# Patient Record
Sex: FEMALE | Race: WHITE | NOT HISPANIC OR LATINO
[De-identification: names, ages, dates, MRNs, and addresses within clinical notes are randomized per-mention and may not be internally consistent; named-entity substitution may affect disease eponyms.]

---

## 2020-01-17 PROBLEM — Z00.00 ENCOUNTER FOR PREVENTIVE HEALTH EXAMINATION: Status: ACTIVE | Noted: 2020-01-17

## 2020-01-31 ENCOUNTER — APPOINTMENT (OUTPATIENT)
Dept: PULMONOLOGY | Facility: CLINIC | Age: 70
End: 2020-01-31
Payer: MEDICARE

## 2020-01-31 VITALS
RESPIRATION RATE: 17 BRPM | HEART RATE: 79 BPM | DIASTOLIC BLOOD PRESSURE: 70 MMHG | WEIGHT: 116 LBS | HEIGHT: 61 IN | SYSTOLIC BLOOD PRESSURE: 130 MMHG | BODY MASS INDEX: 21.9 KG/M2 | OXYGEN SATURATION: 96 %

## 2020-01-31 DIAGNOSIS — J18.9 BRONCHITIS, NOT SPECIFIED AS ACUTE OR CHRONIC: ICD-10-CM

## 2020-01-31 DIAGNOSIS — Z78.9 OTHER SPECIFIED HEALTH STATUS: ICD-10-CM

## 2020-01-31 DIAGNOSIS — Z82.0 FAMILY HISTORY OF EPILEPSY AND OTHER DISEASES OF THE NERVOUS SYSTEM: ICD-10-CM

## 2020-01-31 DIAGNOSIS — Z87.09 PERSONAL HISTORY OF OTHER DISEASES OF THE RESPIRATORY SYSTEM: ICD-10-CM

## 2020-01-31 DIAGNOSIS — Z80.1 FAMILY HISTORY OF MALIGNANT NEOPLASM OF TRACHEA, BRONCHUS AND LUNG: ICD-10-CM

## 2020-01-31 DIAGNOSIS — Z87.891 PERSONAL HISTORY OF NICOTINE DEPENDENCE: ICD-10-CM

## 2020-01-31 DIAGNOSIS — Z86.19 PERSONAL HISTORY OF OTHER INFECTIOUS AND PARASITIC DISEASES: ICD-10-CM

## 2020-01-31 DIAGNOSIS — R06.83 SNORING: ICD-10-CM

## 2020-01-31 DIAGNOSIS — A31.0 PULMONARY MYCOBACTERIAL INFECTION: ICD-10-CM

## 2020-01-31 DIAGNOSIS — Z82.49 FAMILY HISTORY OF ISCHEMIC HEART DISEASE AND OTHER DISEASES OF THE CIRCULATORY SYSTEM: ICD-10-CM

## 2020-01-31 DIAGNOSIS — J18.9 PNEUMONIA, UNSPECIFIED ORGANISM: ICD-10-CM

## 2020-01-31 DIAGNOSIS — J40 BRONCHITIS, NOT SPECIFIED AS ACUTE OR CHRONIC: ICD-10-CM

## 2020-01-31 DIAGNOSIS — J44.9 CHRONIC OBSTRUCTIVE PULMONARY DISEASE, UNSPECIFIED: ICD-10-CM

## 2020-01-31 DIAGNOSIS — R05 COUGH: ICD-10-CM

## 2020-01-31 DIAGNOSIS — R09.82 POSTNASAL DRIP: ICD-10-CM

## 2020-01-31 DIAGNOSIS — R06.02 SHORTNESS OF BREATH: ICD-10-CM

## 2020-01-31 DIAGNOSIS — Z87.39 PERSONAL HISTORY OF OTHER DISEASES OF THE MUSCULOSKELETAL SYSTEM AND CONNECTIVE TISSUE: ICD-10-CM

## 2020-01-31 DIAGNOSIS — J47.9 BRONCHIECTASIS, UNCOMPLICATED: ICD-10-CM

## 2020-01-31 DIAGNOSIS — Z57.9 OCCUPATIONAL EXPOSURE TO UNSPECIFIED RISK FACTOR: ICD-10-CM

## 2020-01-31 PROCEDURE — 94726 PLETHYSMOGRAPHY LUNG VOLUMES: CPT

## 2020-01-31 PROCEDURE — 94060 EVALUATION OF WHEEZING: CPT

## 2020-01-31 PROCEDURE — 71046 X-RAY EXAM CHEST 2 VIEWS: CPT

## 2020-01-31 PROCEDURE — 94729 DIFFUSING CAPACITY: CPT

## 2020-01-31 PROCEDURE — ZZZZZ: CPT

## 2020-01-31 PROCEDURE — 94618 PULMONARY STRESS TESTING: CPT

## 2020-01-31 PROCEDURE — 99204 OFFICE O/P NEW MOD 45 MIN: CPT | Mod: 25

## 2020-01-31 RX ORDER — OLOPATADINE HYDROCHLORIDE 665 UG/1
0.6 SPRAY, METERED NASAL
Qty: 3 | Refills: 1 | Status: ACTIVE | COMMUNITY
Start: 2020-01-31 | End: 1900-01-01

## 2020-01-31 RX ORDER — MUCUS CLEARING DEVICE
EACH MISCELLANEOUS
Qty: 1 | Refills: 0 | Status: ACTIVE | COMMUNITY
Start: 2020-01-31 | End: 1900-01-01

## 2020-01-31 RX ORDER — AZELASTINE HYDROCHLORIDE 137 UG/1
0.1 SPRAY, METERED NASAL TWICE DAILY
Qty: 3 | Refills: 1 | Status: ACTIVE | COMMUNITY
Start: 2020-01-31 | End: 1900-01-01

## 2020-01-31 RX ORDER — FLUTICASONE FUROATE, UMECLIDINIUM BROMIDE AND VILANTEROL TRIFENATATE 100; 62.5; 25 UG/1; UG/1; UG/1
100-62.5-25 POWDER RESPIRATORY (INHALATION)
Refills: 0 | Status: ACTIVE | COMMUNITY

## 2020-01-31 RX ORDER — LEVALBUTEROL HYDROCHLORIDE 0.63 MG/3ML
0.63 SOLUTION RESPIRATORY (INHALATION)
Qty: 120 | Refills: 3 | Status: ACTIVE | COMMUNITY
Start: 2020-01-31 | End: 1900-01-01

## 2020-01-31 SDOH — HEALTH STABILITY - PHYSICAL HEALTH: OCCUPATIONAL EXPOSURE TO UNSPECIFIED RISK FACTOR: Z57.9

## 2020-01-31 NOTE — HISTORY OF PRESENT ILLNESS
[Former] : former [< 30 pack-years] : < 30 pack-years [TextBox_4] : Ms. HERNANDEZ is a 69 year old female presenting to the office today for initial pulmonary evaluation. Her chief complaint is\par -she reports she has been coughing for about 15-20 years\par -she states she went to a pulmonologist in 2010. Since then, she has seen 4 pulmonologists. Her current doctor is good but she wants another opinion.\par -she reports having had a CT at [facility] last in 6/2019\par -she is currently on Trelegy\par -she has had MAC in the past and had ABx for 18 months - in [id number].\par -she reports having SOB lately, sometimes when walking up stairs or inclines\par -she is coughing constantly, and is occasionally productive, sometimes clear and sometimes thick green sputum - often at night\par -she notes her weight is stable.\par -she reports having a post nasal drip\par -she believes her cough originates from her throat\par -she sometimes coughs during the night\par -she reports she snores\par -she notes her memory and concentration are good  \par -she could fall asleep while watching a boring TV show. 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 She also notes there was asbestos in that building as well\par -she denies any wheezing, palpitations, hemoptysis, chest pain, chest pressure, diarrhea, constipation, dysphagia, dizziness, leg swelling, leg pain, itchy eyes, itchy ears, heartburn, reflux, sour taste in the mouth, myalgias or arthralgias.  [TextBox_11] : .5 [TextBox_13] : 20 [TextBox_15] : ~1990

## 2020-01-31 NOTE — ADDENDUM
[FreeTextEntry1] : Documented by Jesus Rankin acting as a scribe for Dr. Shilo Arevalo on 01/31/2020.\par \par All medical record entries made by the Scribe were at my, Dr. Shilo Arevalo's, direction and personally dictated by me on 01/31/2020. I have reviewed the chart and agree that the record accurately reflects my personal performance of the history, physical exam, assessment and plan. I have also personally directed, reviewed, and agree with the discharge instructions.

## 2020-01-31 NOTE — PHYSICAL EXAM

## 2020-01-31 NOTE — PROCEDURE
[FreeTextEntry1] : Full PFT revealed mild-moderate obstructive dysfunction, with a FEV1 of 1.49L, which is 72% of predicted, with no change with use of bronchodilator, normal lung volumes, and a low diffusion of 11.1, which is 70% of predicted, with a normal flow volume loop \par \par CXR reveals a normal sized heart; chronic inflammatory changes b/l, bronchiectatic changes upper and lower lobes, no evidence of tumors or masses \par \par 6 minute walk test reveals a low saturation of 90% with slight dyspnea; walked 489 meters\par \par Full PFT (2015) revealed normal flows, with a FEV1 of 1.93L, which is 89% of predicted, and a diffusion of 13.7, which is 78% of predicted, with a normal flow volume loop \par \par Chest CT (6.2019) reveals bronchiectasis in the mid and peripheral airways, multiple locations b/l, associated with areas of consolidation, lingula, anterior segment of right upper lobe with consequent volume loss and waxing and waning areas in RUL and emphysematous changes in the RLL.

## 2020-01-31 NOTE — REVIEW OF SYSTEMS
[Postnasal Drip] : postnasal drip [Sinus Problems] : sinus problems [Cough] : cough [Sputum] : sputum [Dyspnea] : dyspnea [SOB on Exertion] : sob on exertion [Negative] : Endocrine [Poor Appetite] : no poor appetite [Hemoptysis] : no hemoptysis [Wheezing] : no wheezing [Chest Discomfort] : no chest discomfort [Palpitations] : no palpitations [GERD] : no gerd [Diarrhea] : no diarrhea [Constipation] : no constipation [Dysphagia] : no dysphagia [Arthralgias] : no arthralgias [Myalgias] : no myalgias

## 2020-01-31 NOTE — ASSESSMENT
[FreeTextEntry1] : Ms. HERNANDEZ is a 69 year old female with a history of osteoporosis, bronchiectasis, KRANTHI treated in 2012 at [facility], COPD, emphysema at the bases, frequent bronchitis and PNA, post nasal drip, occupational exposure in the workplace, snoring who comes into the office today for pulmonary evaluation\par \par The patient's shortness of breath is multifactorial due to:\par -pulmonary disease \par      -bronchiectasis\par      -COPD / emphysema\par -poor breathing mechanics \par -out of shape\par \par Her chronic cough is felt to be multifactorial due to:\par -Bronchiectasis\par -COPD\par -Post nasal drip syndrome\par \par Problem 1: Bronchiectasis\par -recommended to use the Aerobika device\par -complete sputum CNS\par \par Seen on the CT of the chest or chest x-ray signifies damaged bronchial tubes focal or diffuse which can be sites of recurrent infections. These areas can be colonized by various organisms including bacteria (hemophilous influenzae/Pseudomonas species etc.) as well as acid fast bacilli (mycobacterial disease- inclusive of TB/KRANTHI etc.). Sputum either for bacteria culture/sensitivity or AFB culture and sensitivity will need to be sent if the patient has sputum- 3 specimens on consecutive days will need to be dropped at the laboratory- if the patient can produce sputum.   The patient has previously used acapella, nebulizer and breathing techniques for airway clearance.  These methods have not provided appropriate secretion manage in this patient.  Hence, initiating vest therapy is necessary.\par \par Problem 2: COPD / Emphysema\par -Add Xopenex (0.63) TID by nebulizer, PRN  (gargle and rinse after use) before use of Trelegy\par -Add Trelegy 1 inhalation QD\par \par -COPD is a progressive disease and although it can’t be cured , appropriate management can slow its progression, reduce frequency and severity of exacerbations, and improve symptoms and the patient quality of life. Hospitalizations are the greatest contributor to the total COPD costs and account for up to 87% of total COPD related costs. Exacerbations are the main cause of admissions and subsequently account for the 40-75% of COPD costs. Inhaled maintenance therapy reduces the incidence of exacerbations in patients with stable COPD. Incorrect inhaler use and nonadherence are major obstacles to achieving COPD treatment goals. Many COPD patients have challenges (impaired inhalation, limited dexterity, reduced cognition: that limit their ability to correctly use their COPD treatment devices resulting in reduced symptom control. Of most importance is smoking cessation and early intervention with respiratory illnesses and contemplation for pulmonary rehab to enhance quality of life.\par -Inhaler technique reviewed as well as oral hygiene techniques reviewed with patient. Avoidance of cold air, extremes of temperature, rescue inhaler should be used before exercise. Order of medication reviewed with patient. Recommended use of a cool mist humidifier in the bedroom.\par \par Problem 3: Prior KRANTHI\par -Recommended reevaluation of KRANTHI due to new therapy available since last treatment\par \par Problem 4: Allergy / Post nasal drip syndrome\par -Add Olopatadine 0.6% at 1 sniff/nostril BID \par \par Environmental measures for allergies were encouraged including mattress and pillow cover, air purifier, and environmental controls. \par \par Problem 5: Frequent Infections / R/o immune deficiency\par -Complete blood work: strep pneumonia titers, IgG levels, quantitative immunoglobulins \par -Due to the fact that this pt has had more infections than would be expected and immunological blood work is indicated this would include: IgG subclasses, quantitative immunoglobulins, Strep pneumoniae titers as well as Vitamin D levels. Based on this blood work we will be able to decide where the pt needs additional pneumococcal vaccine either Prevnar 13 or pneumovax. Immunology evaluation will also be potentially indicated.\par \par Problem 6: ?AB\par -Recommended to complete a home sleep study due to her snoring, EDS, and elevated MP class\par \par Sleep apnea is associated with adverse clinical consequences which an affect most organ systems. Cardiovascular disease risk includes arrhythmias, atrial fibrillation, hypertension, coronary artery disease, and stroke. Metabolic disorders include diabetes type 2, non-alcoholic fatty liver disease. Mood disorder especially depression; and cognitive decline especially in the elderly. Associations with chronic reflux/Foley’s esophagus some but not all inclusive. \par -Reasons include arousal consistent with hypopnea; respiratory events most prominent in REM sleep or supine position; therefore sleep staging and body position are important for accurate diagnosis and estimation of AHI. \par \par Problem 7: Poor Mechanics of Breathing\par - Proper breathing techniques were reviewed with an emphasis of exhalation. Patient instructed to breath in for 1 second and out for four seconds. Patient was encouraged to not talk while walking. \par \par Problem 8: Occupational Exposure in the Workplace\par -Information with be gotten from the blood work\par \par Problem 9: health maintenance\par -s/p influenza vaccine\par -recommended strep pneumonia vaccines after age 65: Prevnar-13 vaccine, followed by Pneumo vaccine 23 one year following (completed)\par -recommended early intervention for URIs\par -recommended regular osteoporosis evaluations\par -recommended early dermatological evaluations\par -recommended after the age of 50 to the age of 70, colonoscopy every 5 years \par \par  Follow up in 6-8 weeks\par -he  is recommended to call with any changes, questions, or concerns. \par \par \par **************************Health records from [facility] were reviewed**************************

## 2020-08-25 ENCOUNTER — APPOINTMENT (OUTPATIENT)
Dept: PULMONOLOGY | Facility: CLINIC | Age: 70
End: 2020-08-25

## 2021-11-10 ENCOUNTER — RESULT REVIEW (OUTPATIENT)
Age: 71
End: 2021-11-10

## 2024-12-24 ENCOUNTER — NON-APPOINTMENT (OUTPATIENT)
Age: 74
End: 2024-12-24

## 2025-02-11 ENCOUNTER — LABORATORY RESULT (OUTPATIENT)
Age: 75
End: 2025-02-11

## 2025-02-11 ENCOUNTER — APPOINTMENT (OUTPATIENT)
Dept: PULMONOLOGY | Facility: CLINIC | Age: 75
End: 2025-02-11
Payer: MEDICARE

## 2025-02-11 VITALS
OXYGEN SATURATION: 97 % | RESPIRATION RATE: 15 BRPM | TEMPERATURE: 98 F | DIASTOLIC BLOOD PRESSURE: 74 MMHG | WEIGHT: 116 LBS | SYSTOLIC BLOOD PRESSURE: 133 MMHG | HEART RATE: 106 BPM | BODY MASS INDEX: 21.9 KG/M2 | HEIGHT: 61 IN

## 2025-02-11 DIAGNOSIS — J44.9 CHRONIC OBSTRUCTIVE PULMONARY DISEASE, UNSPECIFIED: ICD-10-CM

## 2025-02-11 DIAGNOSIS — R06.02 SHORTNESS OF BREATH: ICD-10-CM

## 2025-02-11 DIAGNOSIS — J47.9 BRONCHIECTASIS, UNCOMPLICATED: ICD-10-CM

## 2025-02-11 DIAGNOSIS — Z86.19 PERSONAL HISTORY OF OTHER INFECTIOUS AND PARASITIC DISEASES: ICD-10-CM

## 2025-02-11 PROCEDURE — 99205 OFFICE O/P NEW HI 60 MIN: CPT

## 2025-02-11 RX ORDER — SODIUM CHLORIDE FOR INHALATION 3 %
3 VIAL, NEBULIZER (ML) INHALATION TWICE DAILY
Qty: 1 | Refills: 3 | Status: ACTIVE | COMMUNITY
Start: 2025-02-11 | End: 1900-01-01

## 2025-02-12 ENCOUNTER — NON-APPOINTMENT (OUTPATIENT)
Age: 75
End: 2025-02-12

## 2025-02-12 LAB
A1AT SERPL-MCNC: 264 MG/DL
BASOPHILS # BLD AUTO: 0.07 K/UL
BASOPHILS NFR BLD AUTO: 0.5 %
DEPRECATED KAPPA LC FREE/LAMBDA SER: 1.14 RATIO
EOSINOPHIL # BLD AUTO: 0.7 K/UL
EOSINOPHIL NFR BLD AUTO: 4.7 %
HCT VFR BLD CALC: 37.2 %
HGB BLD-MCNC: 11.2 G/DL
IGA SER QL IEP: 313 MG/DL
IGG SER QL IEP: 1489 MG/DL
IGG SER QL IEP: 1489 MG/DL
IGG1 SER-MCNC: 794 MG/DL
IGG2 SER-MCNC: 428 MG/DL
IGG3 SER-MCNC: 186.9 MG/DL
IGG4 SER-MCNC: 62.4 MG/DL
IGM SER QL IEP: 95 MG/DL
IMM GRANULOCYTES NFR BLD AUTO: 0.4 %
KAPPA LC CSF-MCNC: 2.57 MG/DL
KAPPA LC SERPL-MCNC: 2.92 MG/DL
LYMPHOCYTES # BLD AUTO: 1.75 K/UL
LYMPHOCYTES NFR BLD AUTO: 11.9 %
MAN DIFF?: NORMAL
MCHC RBC-ENTMCNC: 23 PG
MCHC RBC-ENTMCNC: 30.1 G/DL
MCV RBC AUTO: 76.4 FL
MONOCYTES # BLD AUTO: 0.97 K/UL
MONOCYTES NFR BLD AUTO: 6.6 %
NEUTROPHILS # BLD AUTO: 11.2 K/UL
NEUTROPHILS NFR BLD AUTO: 75.9 %
PLATELET # BLD AUTO: 577 K/UL
RBC # BLD: 4.87 M/UL
RBC # FLD: 22.7 %
RHEUMATOID FACT SER QL: 14 IU/ML
WBC # FLD AUTO: 14.75 K/UL

## 2025-02-14 ENCOUNTER — NON-APPOINTMENT (OUTPATIENT)
Age: 75
End: 2025-02-14

## 2025-02-14 LAB
A ALTERNATA IGE QN: 0.38 KUA/L
A FUMIGATUS IGE QN: 6.25 KUA/L
ANA PAT FLD IF-IMP: ABNORMAL
ANACR T: ABNORMAL
C ALBICANS IGE QN: 0.21 KUA/L
C HERBARUM IGE QN: <0.1 KUA/L
CAT DANDER IGE QN: <0.1 KUA/L
COMMON RAGWEED IGE QN: <0.1 KUA/L
D FARINAE IGE QN: <0.1 KUA/L
D PTERONYSS IGE QN: <0.1 KUA/L
DEPRECATED A ALTERNATA IGE RAST QL: 1
DEPRECATED A FUMIGATUS IGE RAST QL: 3
DEPRECATED C ALBICANS IGE RAST QL: NORMAL
DEPRECATED C HERBARUM IGE RAST QL: 0
DEPRECATED CAT DANDER IGE RAST QL: 0
DEPRECATED COMMON RAGWEED IGE RAST QL: 0
DEPRECATED D FARINAE IGE RAST QL: 0
DEPRECATED D PTERONYSS IGE RAST QL: 0
DEPRECATED DOG DANDER IGE RAST QL: 0
DEPRECATED M RACEMOSUS IGE RAST QL: 0
DEPRECATED ROACH IGE RAST QL: 0
DEPRECATED TIMOTHY IGE RAST QL: 0
DEPRECATED WHITE OAK IGE RAST QL: 0
DOG DANDER IGE QN: <0.1 KUA/L
M RACEMOSUS IGE QN: <0.1 KUA/L
ROACH IGE QN: <0.1 KUA/L
TIMOTHY IGE QN: <0.1 KUA/L
TOTAL IGE SMQN RAST: 291 KU/L
WHITE OAK IGE QN: <0.1 KUA/L

## 2025-02-16 PROBLEM — J18.9 FREQUENT EPISODES OF PNEUMONIA: Status: ACTIVE | Noted: 2020-01-31

## 2025-02-16 PROBLEM — A43.9 NOCARDIA INFECTION: Status: ACTIVE | Noted: 2025-02-16

## 2025-02-16 LAB
A FLAVUS AB FLD QL: NEGATIVE
A FUMIGATUS AB FLD QL: NEGATIVE
A NIGER AB FLD QL: NEGATIVE
BACTERIA SPT CULT: ABNORMAL
FUNGUS SPT CULT: ABNORMAL

## 2025-02-18 ENCOUNTER — NON-APPOINTMENT (OUTPATIENT)
Age: 75
End: 2025-02-18

## 2025-02-19 ENCOUNTER — APPOINTMENT (OUTPATIENT)
Dept: CT IMAGING | Facility: CLINIC | Age: 75
End: 2025-02-19
Payer: MEDICARE

## 2025-02-19 ENCOUNTER — NON-APPOINTMENT (OUTPATIENT)
Age: 75
End: 2025-02-19

## 2025-02-19 ENCOUNTER — OUTPATIENT (OUTPATIENT)
Dept: OUTPATIENT SERVICES | Facility: HOSPITAL | Age: 75
LOS: 1 days | End: 2025-02-19
Payer: MEDICARE

## 2025-02-19 DIAGNOSIS — J47.9 BRONCHIECTASIS, UNCOMPLICATED: ICD-10-CM

## 2025-02-19 PROCEDURE — 71250 CT THORAX DX C-: CPT | Mod: 26

## 2025-02-19 PROCEDURE — 70470 CT HEAD/BRAIN W/O & W/DYE: CPT | Mod: 26

## 2025-02-19 PROCEDURE — 70470 CT HEAD/BRAIN W/O & W/DYE: CPT

## 2025-02-19 PROCEDURE — 71250 CT THORAX DX C-: CPT

## 2025-02-20 ENCOUNTER — NON-APPOINTMENT (OUTPATIENT)
Age: 75
End: 2025-02-20

## 2025-02-20 LAB
ACID FAST STN SPT: NORMAL
CFTR MUT TESTED BLD/T: NEGATIVE

## 2025-02-24 ENCOUNTER — NON-APPOINTMENT (OUTPATIENT)
Age: 75
End: 2025-02-24

## 2025-02-24 ENCOUNTER — LABORATORY RESULT (OUTPATIENT)
Age: 75
End: 2025-02-24

## 2025-02-24 ENCOUNTER — APPOINTMENT (OUTPATIENT)
Dept: INFECTIOUS DISEASE | Facility: CLINIC | Age: 75
End: 2025-02-24
Payer: MEDICARE

## 2025-02-24 VITALS
TEMPERATURE: 97.4 F | HEART RATE: 96 BPM | RESPIRATION RATE: 15 BRPM | SYSTOLIC BLOOD PRESSURE: 142 MMHG | DIASTOLIC BLOOD PRESSURE: 79 MMHG | HEIGHT: 61 IN | WEIGHT: 100 LBS | OXYGEN SATURATION: 97 % | BODY MASS INDEX: 18.88 KG/M2

## 2025-02-24 DIAGNOSIS — A43.9 NOCARDIOSIS, UNSPECIFIED: ICD-10-CM

## 2025-02-24 DIAGNOSIS — A31.0 PULMONARY MYCOBACTERIAL INFECTION: ICD-10-CM

## 2025-02-24 DIAGNOSIS — Z86.19 PERSONAL HISTORY OF OTHER INFECTIOUS AND PARASITIC DISEASES: ICD-10-CM

## 2025-02-24 PROBLEM — J98.4 CAVITARY LESION OF LUNG: Status: ACTIVE | Noted: 2025-02-24

## 2025-02-24 PROCEDURE — 99204 OFFICE O/P NEW MOD 45 MIN: CPT

## 2025-02-24 RX ORDER — SULFAMETHOXAZOLE AND TRIMETHOPRIM 800; 160 MG/1; MG/1
800-160 TABLET ORAL 3 TIMES DAILY
Qty: 90 | Refills: 3 | Status: ACTIVE | COMMUNITY
Start: 2025-02-24 | End: 1900-01-01

## 2025-02-24 NOTE — ASSESSMENT
[FreeTextEntry1] : 74 year old with history of previously treated JOSUÉ, unclear if cleared sputum, now with escalating pattern of recurrent pneumonias, cavitary lesions on CT, nocardial isolated on recent sputum in quantities sufficient to be seen on gram stain of sputum, presenting clinically with chronic cough and weight loss.  While I am certainly concerned for JOSUÉ in the background, I agree with aggressive treatment of the nocardia given the high concentration in sputum and findings that are all potentially consistent with nocardiosis. Recommended increasing dose to take TMP/SMX DS TID to achieve 10 mg/kg/day of drug for optimal treatment of nocardia.  Will confirm that laboratory is running sensitivities on Nocardia isolate Will also confirm they are identifying the yeast species that was growing in culture as well   JOSUÉ Cultures already pending from Dr. Orourke, but will obtain induced sputum today in office if patient able.  Given high high overall IgE and findings on asthma screen, will check Aspergillus IgE although picture not typical for ABPE

## 2025-02-24 NOTE — HISTORY OF PRESENT ILLNESS
[FreeTextEntry1] : 74 year old with bronchiectasis and cavitary lung disease, recent sputum with nocardia.  Had first pneumonia at age 30. 15 years ago began to experience significant cough - found to have JOSUÉ. Treated x 1 year. Cough did not resolve. Isn't entirely sure whether sputum cleared or not, but thinks was told that since she was not improving, medication would be stopped despite JOSUÉ being present. Had persistent cough through the years after that About 7-8 years ago developed escalating coughing and recurrent pneumonia. 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  Over last 3 years, had multiple bouts of IV antibiotics for pneumonia, some as home IV and once in hospital. Never told was an unusual type of pneumonia although in Dr. Soto's note indicates that at one point had "Pseudomonas and Nocardia" during inpatient stay. Had evaluation by ID team as inpatient in [facility] about 3 years ago - not told of any unusual organism. Last parenteral treatment was August of 2024. Patient has also received courses of oral levofloxacin sometimes with steroids as outpatient treatment for PNA About 1-2 years ago, put on TIW Azithromycin for COPD/Emphysema as best patient recalls.  Last pre-[facility] CT 12/13: Mild emphysema, diffuse airway thickening and mucoid impaction, some of which are now cavitary, bronchiectasis/bronchiolectasis.  Has continued to experience extreme / constitutional symptoms - referred to Dr. Orourke. Started on TMP/SMX 2/16 when sputum grew nocardia cyriacigeorgica(3 days incubation). Additionally, sputum bacterial stains showed gram positive beaded rods and partial acid-fast organisms. Referred for head CT (normal) and chest CT. Patient had CD of prior CT which she will be bringing to appt with Dr. Orourke tomorrow AFB smear (sputum) negative, cx now 1 week old. Asthma panel minimally positive for Alternaria, positive for aspergillus, but serum aspergillus Ab negative. Total IgE elevated. Patient thinks is feeling a little better constitutionally since starting TMP/SMX, although cough is unchanged.  Reports 20 lb weight loss over last few months

## 2025-02-24 NOTE — CONSULT LETTER
[Dear  ___] : Dear  [unfilled], [( Thank you for referring [unfilled] for consultation for _____ )] : Thank you for referring [unfilled] for consultation for [unfilled] [Please see my note below.] : Please see my note below. [FreeTextEntry2] : Dr. Shawna Orourke [FreeTextEntry3] : Thank you for allowing me to participate in the care of this patient.  Please feel free to contact me with any questions.  Sincerely,  Michael I. Oppenheim, MD  [DrIrena  ___] : Dr. DIAZ

## 2025-02-24 NOTE — PHYSICAL EXAM
[General Appearance - Alert] : alert [General Appearance - In No Acute Distress] : in no acute distress [Sclera] : the sclera and conjunctiva were normal [PERRL With Normal Accommodation] : pupils were equal in size, round, reactive to light [Extraocular Movements] : extraocular movements were intact [Outer Ear] : the ears and nose were normal in appearance [Oropharynx] : the oropharynx was normal with no thrush [Neck Appearance] : the appearance of the neck was normal [Neck Cervical Mass (___cm)] : no neck mass was observed [Respiration, Rhythm And Depth] : normal respiratory rhythm and effort [Heart Rate And Rhythm] : heart rate was normal and rhythm regular [Heart Sounds] : normal S1 and S2 [Heart Sounds Gallop] : no gallops [Murmurs] : no murmurs [Heart Sounds Pericardial Friction Rub] : no pericardial rub [Bowel Sounds] : normal bowel sounds [Abdomen Soft] : soft [Abdomen Tenderness] : non-tender [] : no hepato-splenomegaly [Abdomen Mass (___ Cm)] : no abdominal mass palpated [FreeTextEntry1] : No cervical, supraclavicular, axillary or inguinal LN [Musculoskeletal - Swelling] : no joint swelling [Motor Tone] : muscle strength and tone were normal

## 2025-02-24 NOTE — DATA REVIEWED
[FreeTextEntry1] : CT Reviewed.  Multiple thick-walled small cavities seen, jewels in RUL and diffusely peripherally. Some areas of what seem to be blebs. Patchy infiltrates scattered through lungs

## 2025-02-24 NOTE — HISTORY OF PRESENT ILLNESS
[FreeTextEntry1] : 74 year old with bronchiectasis and cavitary lung disease, recent sputum with nocardia.  Had first pneumonia at age 30. 15 years ago began to experience significant cough - found to have JOSUÉ. Treated x 1 year. Cough did not resolve. Isn't entirely sure whether sputum cleared or not, but thinks was told that since she was not improving, medication would be stopped despite JOSUÉ being present. Had persistent cough through the years after that About 7-8 years ago developed escalating coughing and recurrent pneumonia. 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  Over last 3 years, had multiple bouts of IV antibiotics for pneumonia, some as home IV and once in hospital. Never told was an unusual type of pneumonia although in Dr. Soto's note indicates that at one point had "Pseudomonas and Nocardia" during inpatient stay. Had evaluation by ID team as inpatient in [facility] about 3 years ago - not told of any unusual organism. Last parenteral treatment was August of 2024. Patient has also received courses of oral levofloxacin sometimes with steroids as outpatient treatment for PNA About 1-2 years ago, put on TIW Azithromycin for COPD/Emphysema as best patient recalls.  Last pre-[facility] CT 12/13: Mild emphysema, diffuse airway thickening and mucoid impaction, some of which are now cavitary, bronchiectasis/bronchiolectasis.  Has continued to experience extreme / constitutional symptoms - referred to Dr. Orourke. Started on TMP/SMX 2/16 when sputum grew nocardia cyriacigeorgica(3 days incubation). Additionally, sputum bacterial stains showed gram positive beaded rods and partial acid-fast organisms. Referred for head CT (normal) and chest CT. Patient had CD of prior CT which she will be bringing to appt with Dr. Orourke tomorrow AFB smear (sputum) negative, cx now 1 week old. Asthma panel minimally positive for Alternaria, positive for aspergillus, but serum aspergillus Ab negative. Total IgE elevated. Patient thinks is feeling a little better constitutionally since starting TMP/SMX, although cough is unchanged.  Reports 20 lb weight loss over last few months

## 2025-02-25 ENCOUNTER — NON-APPOINTMENT (OUTPATIENT)
Age: 75
End: 2025-02-25

## 2025-02-28 ENCOUNTER — NON-APPOINTMENT (OUTPATIENT)
Age: 75
End: 2025-02-28

## 2025-02-28 LAB
A FUMIGATUS IGE QN: 7.85 KUA/L
DEPRECATED A FUMIGATUS IGE RAST QL: 3

## 2025-03-04 ENCOUNTER — APPOINTMENT (OUTPATIENT)
Dept: PULMONOLOGY | Facility: CLINIC | Age: 75
End: 2025-03-04
Payer: MEDICARE

## 2025-03-04 VITALS
SYSTOLIC BLOOD PRESSURE: 116 MMHG | HEART RATE: 88 BPM | WEIGHT: 97 LBS | HEIGHT: 60 IN | DIASTOLIC BLOOD PRESSURE: 66 MMHG | OXYGEN SATURATION: 97 % | BODY MASS INDEX: 19.04 KG/M2

## 2025-03-04 DIAGNOSIS — R05.3 CHRONIC COUGH: ICD-10-CM

## 2025-03-04 DIAGNOSIS — J47.9 BRONCHIECTASIS, UNCOMPLICATED: ICD-10-CM

## 2025-03-04 DIAGNOSIS — R06.02 SHORTNESS OF BREATH: ICD-10-CM

## 2025-03-04 DIAGNOSIS — J18.9 PNEUMONIA, UNSPECIFIED ORGANISM: ICD-10-CM

## 2025-03-04 DIAGNOSIS — J98.4 OTHER DISORDERS OF LUNG: ICD-10-CM

## 2025-03-04 PROCEDURE — 99215 OFFICE O/P EST HI 40 MIN: CPT | Mod: 25

## 2025-03-04 PROCEDURE — 94726 PLETHYSMOGRAPHY LUNG VOLUMES: CPT

## 2025-03-04 PROCEDURE — ZZZZZ: CPT

## 2025-03-04 PROCEDURE — 94618 PULMONARY STRESS TESTING: CPT

## 2025-03-04 PROCEDURE — 94060 EVALUATION OF WHEEZING: CPT

## 2025-03-04 PROCEDURE — 94729 DIFFUSING CAPACITY: CPT

## 2025-03-04 RX ORDER — SODIUM CHLORIDE FOR INHALATION 3.5 %
3.5 VIAL, NEBULIZER (ML) INHALATION
Qty: 1 | Refills: 5 | Status: ACTIVE | COMMUNITY
Start: 2025-03-04 | End: 1900-01-01

## 2025-03-06 ENCOUNTER — NON-APPOINTMENT (OUTPATIENT)
Age: 75
End: 2025-03-06

## 2025-03-07 LAB — ACID FAST STN SPT: NORMAL

## 2025-03-31 ENCOUNTER — APPOINTMENT (OUTPATIENT)
Dept: INFECTIOUS DISEASE | Facility: CLINIC | Age: 75
End: 2025-03-31
Payer: MEDICARE

## 2025-03-31 VITALS
BODY MASS INDEX: 20.22 KG/M2 | HEIGHT: 60 IN | HEART RATE: 109 BPM | TEMPERATURE: 98.5 F | WEIGHT: 103 LBS | OXYGEN SATURATION: 95 % | DIASTOLIC BLOOD PRESSURE: 83 MMHG | SYSTOLIC BLOOD PRESSURE: 124 MMHG

## 2025-03-31 DIAGNOSIS — Z51.81 ENCOUNTER FOR THERAPEUTIC DRUG LVL MONITORING: ICD-10-CM

## 2025-03-31 DIAGNOSIS — R05.3 CHRONIC COUGH: ICD-10-CM

## 2025-03-31 DIAGNOSIS — A43.9 NOCARDIOSIS, UNSPECIFIED: ICD-10-CM

## 2025-03-31 PROCEDURE — 99213 OFFICE O/P EST LOW 20 MIN: CPT

## 2025-03-31 NOTE — HISTORY OF PRESENT ILLNESS
[FreeTextEntry1] : Reports that felt significantly better during first three weeks on TMP/SMX. This week feels that she has regressed - feels more mucus in lungs as well as having significant coughing bouts - last night had particularly severe bout during which she felt she could not breathe. Used her oxygen in that setting.  No fever, chills, sweats. Has not been able to bring up any sputum to report color/character.

## 2025-03-31 NOTE — PHYSICAL EXAM
[General Appearance - Alert] : alert [Respiration, Rhythm And Depth] : normal respiratory rhythm and effort [FreeTextEntry1] : Diffuse crackles in lung periphery. No wheezes or rhonchi  [Heart Rate And Rhythm] : heart rate was normal and rhythm regular [Heart Sounds] : normal S1 and S2 [Heart Sounds Gallop] : no gallops [Murmurs] : no murmurs [Heart Sounds Pericardial Friction Rub] : no pericardial rub [Bowel Sounds] : normal bowel sounds [Abdomen Soft] : soft [Abdomen Tenderness] : non-tender [] : no hepato-splenomegaly [Abdomen Mass (___ Cm)] : no abdominal mass palpated

## 2025-03-31 NOTE — ASSESSMENT
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 Sensitivities pending (were requested from Microbiology lab) but likely TMP/SMX sensitive.  Unclear whether has new acute PNA as etiology of worsening?  Patient with markers for ABPA - possible need for ABPA treatment?  Will obtain sputum (both bacterial and fungal cultures, latter is good medium for nocardia growth) today - if bacterial cultures suggest organism that might be causing lower respiratory infection, will treat Will obtain CXR - unfortunately no baseline available so will be difficult to interpret - encouraged patient to get CDs of prior studies and bring with her to CXR appointment. In general, though, will be useful to have plain film imaging to evaluate acute issues without CT. To continue TMP/SMX for minimum 3 months.  Will d/w Dr. Orourke re: possible ABPA treatment

## 2025-03-31 NOTE — HISTORY OF PRESENT ILLNESS
Group Topic: BH NIGEL Education    Date: 8/18/2022  Start Time: 1100  End Time: 1145  Facilitators: Adamaris Chavis LCSW; Julisa Saleem LPC    Focus: Anxiety- education provided on cause and purpose of anxiety, and natural coping skills to manage anxiety symptoms.  Number in attendance: 18    Goals: Recognizing primary emotions in recovery: To help clients become mindful of their emotions and understand the connections these emotions can have in recovery  Handouts: Ways to Manage Anxiety - Naturally  Method: Group  Attendance: Present  Mood/Affect: Appropriate  Behavior/Socialization: Appropriate to group  Participation: Active  Overall Patient Response to Group: Appropriate to topic  Individual Response to Group: Patient listened intently to education presented but did not share his thoughts aloud with group.   Ability to Apply Content to Group: 5     ANNABELLE Gomez, APSW, SAC-IT  8/18/2022             [FreeTextEntry1] : Reports that felt significantly better during first three weeks on TMP/SMX. This week feels that she has regressed - feels more mucus in lungs as well as having significant coughing bouts - last night had particularly severe bout during which she felt she could not breathe. Used her oxygen in that setting.  No fever, chills, sweats. Has not been able to bring up any sputum to report color/character.

## 2025-04-02 ENCOUNTER — OUTPATIENT (OUTPATIENT)
Dept: OUTPATIENT SERVICES | Facility: HOSPITAL | Age: 75
LOS: 1 days | End: 2025-04-02
Payer: MEDICARE

## 2025-04-02 ENCOUNTER — APPOINTMENT (OUTPATIENT)
Dept: RADIOLOGY | Facility: CLINIC | Age: 75
End: 2025-04-02
Payer: MEDICARE

## 2025-04-02 DIAGNOSIS — R05.3 CHRONIC COUGH: ICD-10-CM

## 2025-04-02 DIAGNOSIS — A43.9 NOCARDIOSIS, UNSPECIFIED: ICD-10-CM

## 2025-04-02 DIAGNOSIS — B44.81 ALLERGIC BRONCHOPULMONARY ASPERGILLOSIS: ICD-10-CM

## 2025-04-02 LAB
ALBUMIN SERPL ELPH-MCNC: 4.1 G/DL
ALP BLD-CCNC: 104 U/L
ALT SERPL-CCNC: 22 U/L
ANION GAP SERPL CALC-SCNC: 13 MMOL/L
AST SERPL-CCNC: 21 U/L
BILIRUB SERPL-MCNC: <0.2 MG/DL
BUN SERPL-MCNC: 16 MG/DL
CALCIUM SERPL-MCNC: 9.4 MG/DL
CHLORIDE SERPL-SCNC: 99 MMOL/L
CO2 SERPL-SCNC: 23 MMOL/L
CREAT SERPL-MCNC: 0.94 MG/DL
EGFRCR SERPLBLD CKD-EPI 2021: 64 ML/MIN/1.73M2
POTASSIUM SERPL-SCNC: 4.2 MMOL/L
PROT SERPL-MCNC: 6.7 G/DL
SODIUM SERPL-SCNC: 135 MMOL/L

## 2025-04-02 PROCEDURE — 71046 X-RAY EXAM CHEST 2 VIEWS: CPT

## 2025-04-02 PROCEDURE — 71046 X-RAY EXAM CHEST 2 VIEWS: CPT | Mod: 26

## 2025-04-03 ENCOUNTER — NON-APPOINTMENT (OUTPATIENT)
Age: 75
End: 2025-04-03

## 2025-04-04 ENCOUNTER — NON-APPOINTMENT (OUTPATIENT)
Age: 75
End: 2025-04-04

## 2025-04-04 RX ORDER — DUPILUMAB 300 MG/2ML
300 INJECTION, SOLUTION SUBCUTANEOUS
Qty: 2 | Refills: 0 | Status: ACTIVE | COMMUNITY
Start: 2025-04-02 | End: 1900-01-01

## 2025-04-04 RX ORDER — DUPILUMAB 300 MG/2ML
300 INJECTION, SOLUTION SUBCUTANEOUS
Qty: 2 | Refills: 11 | Status: ACTIVE | COMMUNITY
Start: 2025-04-02

## 2025-04-09 ENCOUNTER — APPOINTMENT (OUTPATIENT)
Dept: INTERNAL MEDICINE | Facility: CLINIC | Age: 75
End: 2025-04-09

## 2025-04-09 ENCOUNTER — OUTPATIENT (OUTPATIENT)
Dept: OUTPATIENT SERVICES | Facility: HOSPITAL | Age: 75
LOS: 1 days | End: 2025-04-09

## 2025-04-09 LAB — BACTERIA SPT CULT: NORMAL

## 2025-04-10 ENCOUNTER — NON-APPOINTMENT (OUTPATIENT)
Age: 75
End: 2025-04-10

## 2025-04-11 LAB — FUNGUS SPT CULT: ABNORMAL

## 2025-04-14 ENCOUNTER — NON-APPOINTMENT (OUTPATIENT)
Age: 75
End: 2025-04-14

## 2025-04-16 ENCOUNTER — APPOINTMENT (OUTPATIENT)
Dept: PULMONOLOGY | Facility: CLINIC | Age: 75
End: 2025-04-16
Payer: MEDICARE

## 2025-04-16 VITALS
OXYGEN SATURATION: 95 % | WEIGHT: 98 LBS | BODY MASS INDEX: 19.24 KG/M2 | TEMPERATURE: 97.1 F | DIASTOLIC BLOOD PRESSURE: 71 MMHG | HEART RATE: 92 BPM | SYSTOLIC BLOOD PRESSURE: 134 MMHG | HEIGHT: 60 IN | RESPIRATION RATE: 17 BRPM

## 2025-04-16 DIAGNOSIS — B44.81 ALLERGIC BRONCHOPULMONARY ASPERGILLOSIS: ICD-10-CM

## 2025-04-16 DIAGNOSIS — R06.02 SHORTNESS OF BREATH: ICD-10-CM

## 2025-04-16 DIAGNOSIS — J40 BRONCHITIS, NOT SPECIFIED AS ACUTE OR CHRONIC: ICD-10-CM

## 2025-04-16 DIAGNOSIS — J47.9 BRONCHIECTASIS, UNCOMPLICATED: ICD-10-CM

## 2025-04-16 DIAGNOSIS — R05.3 CHRONIC COUGH: ICD-10-CM

## 2025-04-16 DIAGNOSIS — J18.9 BRONCHITIS, NOT SPECIFIED AS ACUTE OR CHRONIC: ICD-10-CM

## 2025-04-16 DIAGNOSIS — A43.9 NOCARDIOSIS, UNSPECIFIED: ICD-10-CM

## 2025-04-16 PROCEDURE — 99215 OFFICE O/P EST HI 40 MIN: CPT

## 2025-04-16 NOTE — HISTORY OF PRESENT ILLNESS
[Former] : former [< 20 pack-years] : < 20 pack-years [TextBox_4] : 73 yo F former smoker with bronchiectasis and iron deficiency anemia here for follow up of her bronchiectasis and evaluation of increasing dyspnea. She has a chronic productive cough, and clears one tablespoon of sputum daily using airway clearance techniques and Aerobika valve. History is notable for 20lb weight loss over the last year. 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 AFB x 2 is negative thus far. PATIENCE positive at 1:640 but no other antibodies are positive. 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 CFTR identified some variants which are not associated with clinical disease. 6/14/2019 CT scan report mid and peripheral bilateral bronchiectasis with areas of consolidation worse in the lingula and RUL with some volume loss. stable RLL emphysematous changes. had some areas of improvement. no pleff. no LAD   3/4/25: PFT moderately severe obstructive ventilatory impairment wihtout BD response.  DLCO is severely reduced.  Exercise oximetry- walked > 10 minutes She was started on Bactrim TID by Dr. Oppenheim (ID) for the nocardia which has been seen in the past.  Initially felt better and now dyspnea is recurring.   As she met criteria for ABPA, I started DUpixent in early April.  Returns for follow up: still short of breath.  Does not understand why.  Coughs up at least one tablespoon each time she uses the clearance measures- clear. Denies fever.   Currently using oxygen.  Previously at time of 6 minuite walk test her resting oxygen saturation was 96% on 3/3/25 and with ambulation her sat did not drop below 91-93%. Today resting oxygen saturation is 95% and on 3 L pulse flow maintains sat at 93% with ambulation.    RESPIRATORY SYMPTOMS  HAS PATIENT EXPERIENCED DYSPNEA?Yes   GRADE DESCRIPTION OF BREATHLESSNESS 0 I only get breathless with strenuous exercise 1 I get short of breath when hurrying on level ground or walking up a slight hill 2 On level ground, I walk slower than people of the same age because of breathlessness, or I have to stop for breath when walking at my own pace 3 I stop for breath after walking about 100 yards or after a few minutes on level ground 4 I am too breathless to leave the house or I am breathless when dressing Patients dyspnea grade today is: ____4__    after using albuterol, uses valve and then expectorates a tablespoon of clear sputum   Using Trelegy daily, feels rumble in her chest and coughs up mucous.  She is on Bactrim TID for the nocardia. Was on Azithromycin 250mg TIW up until recently for Bronchiectasis.   Brought disc of old CTs for me to load today.   [YearQuit] : 1995

## 2025-04-16 NOTE — ASSESSMENT
[FreeTextEntry1] : 73 yo F former smoker with bronchiectasis and iron deficiency anemia, being treated with airway clearance regimen, bactrim for nocardia, and recently started on Dupixent therapy for ABPA .  History is notable for 20lb weight loss over the last year.  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 AFB x 2 is negative thus far.  PATIENCE positive at 1:640 but no other antibodies are positive.  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  She comes in for follow up today with complaints of increasing dyspnea, need to use oxygen more.  She denies increase in sputum or change in color or infectious symptoms.  ON PE today VSS oxygen saturation at rest at RA is 95%. Lungs are mostly clear today.There is no edema.  PFTs 3/4/25 show a moderate obstructive ventilatory impairment without BD response.  DLCO is severely reduced.  She did not significantly desaturate on exercise oximetry.  Maintained sats 93% at the end of several minutes of walking. IMpression:  Bronchiectasis diffuse, with most severe changes in the LLL being treated for Nocardia and recently started Dupixent for ABPA, eosinophilic bronchiectasis.  She is using airway clearance once a day, reports increasing dyspnea and need for oxygen..   Plan: 1-Advised to use airway clearance twice daily 2- continue trelegy 200 daily 3- sputum culture for AFB, fungal and C&S sent today 4- CTPA to assess change in pulmonary parenchyma rule out PE as explanation for clinical worsening. Will advise after above.

## 2025-04-16 NOTE — REVIEW OF SYSTEMS
[Fatigue] : fatigue [Cough] : cough [Sputum] : sputum [Dyspnea] : dyspnea [SOB on Exertion] : sob on exertion [Negative] : Gastrointestinal

## 2025-04-18 ENCOUNTER — RX RENEWAL (OUTPATIENT)
Age: 75
End: 2025-04-18

## 2025-04-18 ENCOUNTER — APPOINTMENT (OUTPATIENT)
Dept: CT IMAGING | Facility: CLINIC | Age: 75
End: 2025-04-18
Payer: MEDICARE

## 2025-04-18 ENCOUNTER — OUTPATIENT (OUTPATIENT)
Dept: OUTPATIENT SERVICES | Facility: HOSPITAL | Age: 75
LOS: 1 days | End: 2025-04-18
Payer: MEDICARE

## 2025-04-18 DIAGNOSIS — J47.9 BRONCHIECTASIS, UNCOMPLICATED: ICD-10-CM

## 2025-04-18 DIAGNOSIS — R05.3 CHRONIC COUGH: ICD-10-CM

## 2025-04-18 LAB — BACTERIA SPT CULT: NORMAL

## 2025-04-18 PROCEDURE — 71275 CT ANGIOGRAPHY CHEST: CPT | Mod: 26

## 2025-04-18 PROCEDURE — 71275 CT ANGIOGRAPHY CHEST: CPT

## 2025-04-21 ENCOUNTER — NON-APPOINTMENT (OUTPATIENT)
Age: 75
End: 2025-04-21

## 2025-04-24 LAB
ACID FAST STN SPT: NORMAL
FUNGUS SPT CULT: NORMAL

## 2025-05-09 ENCOUNTER — NON-APPOINTMENT (OUTPATIENT)
Age: 75
End: 2025-05-09

## 2025-05-12 ENCOUNTER — NON-APPOINTMENT (OUTPATIENT)
Age: 75
End: 2025-05-12

## 2025-05-13 ENCOUNTER — NON-APPOINTMENT (OUTPATIENT)
Age: 75
End: 2025-05-13

## 2025-05-16 ENCOUNTER — NON-APPOINTMENT (OUTPATIENT)
Age: 75
End: 2025-05-16

## 2025-05-23 ENCOUNTER — NON-APPOINTMENT (OUTPATIENT)
Age: 75
End: 2025-05-23

## 2025-05-29 ENCOUNTER — RX RENEWAL (OUTPATIENT)
Age: 75
End: 2025-05-29

## 2025-06-02 ENCOUNTER — NON-APPOINTMENT (OUTPATIENT)
Age: 75
End: 2025-06-02

## 2025-06-05 ENCOUNTER — APPOINTMENT (OUTPATIENT)
Dept: PULMONOLOGY | Facility: CLINIC | Age: 75
End: 2025-06-05
Payer: MEDICARE

## 2025-06-05 VITALS
OXYGEN SATURATION: 97 % | HEIGHT: 60 IN | DIASTOLIC BLOOD PRESSURE: 72 MMHG | HEART RATE: 105 BPM | SYSTOLIC BLOOD PRESSURE: 121 MMHG | TEMPERATURE: 97.5 F | BODY MASS INDEX: 19.24 KG/M2 | WEIGHT: 98 LBS

## 2025-06-05 DIAGNOSIS — J47.9 BRONCHIECTASIS, UNCOMPLICATED: ICD-10-CM

## 2025-06-05 DIAGNOSIS — R63.4 ABNORMAL WEIGHT LOSS: ICD-10-CM

## 2025-06-05 PROCEDURE — G2211 COMPLEX E/M VISIT ADD ON: CPT

## 2025-06-05 PROCEDURE — 99215 OFFICE O/P EST HI 40 MIN: CPT

## 2025-06-05 RX ORDER — FLUTICASONE FUROATE, UMECLIDINIUM BROMIDE AND VILANTEROL TRIFENATATE 200; 62.5; 25 UG/1; UG/1; UG/1
200-62.5-25 POWDER RESPIRATORY (INHALATION) DAILY
Qty: 1 | Refills: 5 | Status: ACTIVE | COMMUNITY
Start: 2025-06-05 | End: 1900-01-01

## 2025-06-05 RX ORDER — MEGESTROL ACETATE 40 MG/ML
400 SUSPENSION ORAL
Qty: 1 | Refills: 5 | Status: ACTIVE | COMMUNITY
Start: 2025-06-05 | End: 1900-01-01

## 2025-06-06 ENCOUNTER — NON-APPOINTMENT (OUTPATIENT)
Age: 75
End: 2025-06-06

## 2025-06-09 ENCOUNTER — APPOINTMENT (OUTPATIENT)
Dept: INFECTIOUS DISEASE | Facility: CLINIC | Age: 75
End: 2025-06-09
Payer: MEDICARE

## 2025-06-09 VITALS
HEIGHT: 60 IN | SYSTOLIC BLOOD PRESSURE: 129 MMHG | BODY MASS INDEX: 19.24 KG/M2 | WEIGHT: 98 LBS | TEMPERATURE: 97.9 F | HEART RATE: 89 BPM | OXYGEN SATURATION: 99 % | DIASTOLIC BLOOD PRESSURE: 80 MMHG

## 2025-06-09 PROCEDURE — 99213 OFFICE O/P EST LOW 20 MIN: CPT

## 2025-06-10 ENCOUNTER — NON-APPOINTMENT (OUTPATIENT)
Age: 75
End: 2025-06-10

## 2025-06-10 LAB
ALBUMIN SERPL ELPH-MCNC: 4.1 G/DL
ALP BLD-CCNC: 105 U/L
ALT SERPL-CCNC: 18 U/L
ANION GAP SERPL CALC-SCNC: 14 MMOL/L
AST SERPL-CCNC: 17 U/L
BACTERIA SPT CULT: NORMAL
BASOPHILS # BLD AUTO: 0.03 K/UL
BASOPHILS NFR BLD AUTO: 0.3 %
BILIRUB SERPL-MCNC: 0.2 MG/DL
BUN SERPL-MCNC: 12 MG/DL
CALCIUM SERPL-MCNC: 9.3 MG/DL
CHLORIDE SERPL-SCNC: 98 MMOL/L
CO2 SERPL-SCNC: 22 MMOL/L
CREAT SERPL-MCNC: 0.92 MG/DL
EGFRCR SERPLBLD CKD-EPI 2021: 65 ML/MIN/1.73M2
EOSINOPHIL # BLD AUTO: 1.17 K/UL
EOSINOPHIL NFR BLD AUTO: 12.1 %
FUNGUS SPT CULT: ABNORMAL
HCT VFR BLD CALC: 33.4 %
HGB BLD-MCNC: 11.3 G/DL
IMM GRANULOCYTES NFR BLD AUTO: 0.3 %
LYMPHOCYTES # BLD AUTO: 1.66 K/UL
LYMPHOCYTES NFR BLD AUTO: 17.2 %
MAN DIFF?: NORMAL
MCHC RBC-ENTMCNC: 33.7 PG
MCHC RBC-ENTMCNC: 33.8 G/DL
MCV RBC AUTO: 99.7 FL
MONOCYTES # BLD AUTO: 0.69 K/UL
MONOCYTES NFR BLD AUTO: 7.1 %
NEUTROPHILS # BLD AUTO: 6.08 K/UL
NEUTROPHILS NFR BLD AUTO: 63 %
PLATELET # BLD AUTO: 274 K/UL
POTASSIUM SERPL-SCNC: 4.3 MMOL/L
PROT SERPL-MCNC: 6.8 G/DL
RBC # BLD: 3.35 M/UL
RBC # FLD: 18.4 %
SODIUM SERPL-SCNC: 135 MMOL/L
WBC # FLD AUTO: 9.66 K/UL

## 2025-06-10 NOTE — DATA REVIEWED
[FreeTextEntry1] : 3/26 (seen on patient portal) WBC 8  EOS: 11% / Abs 900 Hgb 11.5 Plt 297  2/2025 CT compared with 4/25 CT  Some improvement in tree-in-bud & ground glass opacities to my eye

## 2025-06-10 NOTE — ASSESSMENT
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 Last AFB and fungal cultures negative for nocardia but were expectorated specimens - will obtain induced specimens today, and will include dedicated nocardial culture. To continue TMP/SMX for now - check CBC/CMP to r/o adverse events from meds If repeat sputa all negative, will consider D/C at 6 months (minimum course of treatment for nocardiosis)  Had one culture (induced) with aspergillus isolated - will re-check today with induced sputum and, if still present, would consider short-course of voriconazole. Encouraged to give dupixent longer trial.  Advised to defer shingles vaccine until 1 year after active varicella/zoster infection  Continued follow-up with Pulmonary.

## 2025-06-10 NOTE — HISTORY OF PRESENT ILLNESS
[FreeTextEntry1] : Now on TMP/SMX x 3-4 months Unsure if feels any improvement clinically Also started on Dupixent for ABPA component.  Does not feel any improvement since addition of Dupixent in April CTPA negative for PE  Had episode of zoster since last visit. No sequelae.

## 2025-06-10 NOTE — PHYSICAL EXAM
[General Appearance - Alert] : alert [FreeTextEntry1] : Diffuse crackles over most of lung fields except right middle lobe. Good oxygen movement

## 2025-06-14 LAB — ACID FAST STN SPT: NORMAL

## 2025-06-16 LAB — NOCARDIA SP IDENTIFIED IN ISOLATE BY ORGANISM SPECIFIC CULTURE: ABNORMAL

## 2025-06-17 LAB — FUNGUS SPT CULT: ABNORMAL

## 2025-06-18 ENCOUNTER — NON-APPOINTMENT (OUTPATIENT)
Age: 75
End: 2025-06-18

## 2025-06-18 LAB — ACID FAST STN SPT: NORMAL

## 2025-06-26 ENCOUNTER — NON-APPOINTMENT (OUTPATIENT)
Age: 75
End: 2025-06-26

## 2025-07-03 ENCOUNTER — NON-APPOINTMENT (OUTPATIENT)
Age: 75
End: 2025-07-03

## 2025-07-07 ENCOUNTER — NON-APPOINTMENT (OUTPATIENT)
Age: 75
End: 2025-07-07

## 2025-07-15 ENCOUNTER — NON-APPOINTMENT (OUTPATIENT)
Age: 75
End: 2025-07-15

## 2025-07-23 ENCOUNTER — APPOINTMENT (OUTPATIENT)
Dept: INFECTIOUS DISEASE | Facility: CLINIC | Age: 75
End: 2025-07-23
Payer: MEDICARE

## 2025-07-23 VITALS
HEIGHT: 60 IN | HEART RATE: 69 BPM | OXYGEN SATURATION: 96 % | TEMPERATURE: 98.2 F | WEIGHT: 100 LBS | DIASTOLIC BLOOD PRESSURE: 74 MMHG | SYSTOLIC BLOOD PRESSURE: 127 MMHG | BODY MASS INDEX: 19.63 KG/M2

## 2025-07-23 DIAGNOSIS — J47.9 BRONCHIECTASIS, UNCOMPLICATED: ICD-10-CM

## 2025-07-23 DIAGNOSIS — B44.81 ALLERGIC BRONCHOPULMONARY ASPERGILLOSIS: ICD-10-CM

## 2025-07-23 DIAGNOSIS — A43.9 NOCARDIOSIS, UNSPECIFIED: ICD-10-CM

## 2025-07-23 PROCEDURE — 99213 OFFICE O/P EST LOW 20 MIN: CPT

## 2025-07-23 NOTE — HISTORY OF PRESENT ILLNESS
[FreeTextEntry1] : Is feeling significantly better over last 3-4 weeks. Reports that if 1 year ago symptoms would be 0, reports was 9 earlier this year and currently a 3. Still has intermittent cough with whitish sputum, sometimes in paroxysms, but far less frequently O2 needs significantly decreased No fever, chills. Denies hemoptysis Has gained some weight

## 2025-07-23 NOTE — ASSESSMENT
[FreeTextEntry1] : Now almost 5 months into therapy for nocardia, also started on therapy for ABPA about 3-4 months ago, finally noting improvement in pulmonary status.  Last sputum specimens with aspergillus in both fungal and nocardia cultures - given that patient has had some improvement, would not pursue treatment at this time. However, if does not return to pulmonary baseline from last year in next month, will consider short course of Voriconaziole. Will re-check sputum today as well Will decrease TMP/SMX to BID given how well patient is doing and negative culture from last visit Consider D/C next month at 6 months of therapy for nocardia if today's sputum clear as well.

## 2025-07-23 NOTE — REASON FOR VISIT
[Follow-Up: _____] : a [unfilled] follow-up visit [Spouse] : spouse [FreeTextEntry1] : Nocardia in sputum cultures, ABPA

## 2025-07-23 NOTE — PHYSICAL EXAM
[General Appearance - Alert] : alert [General Appearance - In No Acute Distress] : in no acute distress [Heart Rate And Rhythm] : heart rate was normal and rhythm regular [Heart Sounds] : normal S1 and S2 [Heart Sounds Gallop] : no gallops [Murmurs] : no murmurs [Heart Sounds Pericardial Friction Rub] : no pericardial rub [Bowel Sounds] : normal bowel sounds [Abdomen Soft] : soft [Abdomen Tenderness] : non-tender [] : no hepato-splenomegaly [Abdomen Mass (___ Cm)] : no abdominal mass palpated [FreeTextEntry1] : Inspiratory rhonchi R > L, Tubular BS at bilateral bases

## 2025-07-25 ENCOUNTER — NON-APPOINTMENT (OUTPATIENT)
Age: 75
End: 2025-07-25

## 2025-07-29 LAB — FUNGUS SPT CULT: ABNORMAL

## 2025-08-08 LAB — NOCARDIA SP IDENTIFIED IN ISOLATE BY ORGANISM SPECIFIC CULTURE: NORMAL

## 2025-08-22 ENCOUNTER — APPOINTMENT (OUTPATIENT)
Dept: INFECTIOUS DISEASE | Facility: CLINIC | Age: 75
End: 2025-08-22
Payer: MEDICARE

## 2025-08-22 VITALS
TEMPERATURE: 97.9 F | HEIGHT: 60 IN | DIASTOLIC BLOOD PRESSURE: 85 MMHG | OXYGEN SATURATION: 96 % | BODY MASS INDEX: 20.03 KG/M2 | SYSTOLIC BLOOD PRESSURE: 137 MMHG | WEIGHT: 102 LBS | HEART RATE: 82 BPM

## 2025-08-22 DIAGNOSIS — A43.9 NOCARDIOSIS, UNSPECIFIED: ICD-10-CM

## 2025-08-22 DIAGNOSIS — B44.81 ALLERGIC BRONCHOPULMONARY ASPERGILLOSIS: ICD-10-CM

## 2025-08-22 DIAGNOSIS — J47.9 BRONCHIECTASIS, UNCOMPLICATED: ICD-10-CM

## 2025-08-22 DIAGNOSIS — R05.3 CHRONIC COUGH: ICD-10-CM

## 2025-08-22 PROCEDURE — 99213 OFFICE O/P EST LOW 20 MIN: CPT

## 2025-08-22 PROCEDURE — G2211 COMPLEX E/M VISIT ADD ON: CPT

## 2025-08-30 LAB
ACID FAST STN SPT: NORMAL
FUNGUS SPT CULT: ABNORMAL
NOCARDIA SP IDENTIFIED IN ISOLATE BY ORGANISM SPECIFIC CULTURE: ABNORMAL

## 2025-09-09 RX ORDER — VORICONAZOLE 200 MG/1
200 TABLET ORAL
Qty: 28 | Refills: 1 | Status: ACTIVE | COMMUNITY
Start: 2025-09-09 | End: 1900-01-01

## 2025-09-16 ENCOUNTER — APPOINTMENT (OUTPATIENT)
Dept: PULMONOLOGY | Facility: CLINIC | Age: 75
End: 2025-09-16
Payer: MEDICARE

## 2025-09-16 VITALS
SYSTOLIC BLOOD PRESSURE: 178 MMHG | HEIGHT: 60 IN | DIASTOLIC BLOOD PRESSURE: 80 MMHG | WEIGHT: 104 LBS | RESPIRATION RATE: 16 BRPM | BODY MASS INDEX: 20.42 KG/M2 | OXYGEN SATURATION: 93 % | HEART RATE: 73 BPM | TEMPERATURE: 97.5 F

## 2025-09-16 DIAGNOSIS — B44.9 ASPERGILLOSIS, UNSPECIFIED: ICD-10-CM

## 2025-09-16 DIAGNOSIS — R05.3 CHRONIC COUGH: ICD-10-CM

## 2025-09-16 DIAGNOSIS — J47.9 BRONCHIECTASIS, UNCOMPLICATED: ICD-10-CM

## 2025-09-16 PROCEDURE — 99214 OFFICE O/P EST MOD 30 MIN: CPT | Mod: GC

## 2025-09-16 PROCEDURE — G2211 COMPLEX E/M VISIT ADD ON: CPT

## 2025-09-16 RX ORDER — FLUTICASONE FUROATE, UMECLIDINIUM BROMIDE AND VILANTEROL TRIFENATATE 100; 62.5; 25 UG/1; UG/1; UG/1
100-62.5-25 POWDER RESPIRATORY (INHALATION) DAILY
Qty: 3 | Refills: 3 | Status: ACTIVE | COMMUNITY
Start: 2025-09-16 | End: 1900-01-01